# Patient Record
Sex: FEMALE | Race: WHITE
[De-identification: names, ages, dates, MRNs, and addresses within clinical notes are randomized per-mention and may not be internally consistent; named-entity substitution may affect disease eponyms.]

---

## 2017-07-30 ENCOUNTER — HOSPITAL ENCOUNTER (EMERGENCY)
Dept: HOSPITAL 10 - FTE | Age: 67
Discharge: HOME | End: 2017-07-30
Payer: COMMERCIAL

## 2017-07-30 VITALS
WEIGHT: 167.55 LBS | BODY MASS INDEX: 30.83 KG/M2 | HEIGHT: 62 IN | WEIGHT: 167.55 LBS | HEIGHT: 62 IN | BODY MASS INDEX: 30.83 KG/M2

## 2017-07-30 DIAGNOSIS — M25.561: Primary | ICD-10-CM

## 2017-07-30 DIAGNOSIS — I10: ICD-10-CM

## 2017-07-30 PROCEDURE — 73562 X-RAY EXAM OF KNEE 3: CPT
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
## 2017-07-30 NOTE — RADRPT
PROCEDURE:   XR Knee. 

 

CLINICAL INDICATION:   Right knee pain. 

 

TECHNIQUE:   AP, lateral and oblique views of the right knee were obtained. The images reviewed  on 
a PACS workstation. 

 

COMPARISON:   None. 

 

FINDINGS:

The bones appear intact, with no evidence of fracture, erosion, demineralization, or dislocation. Th
e alignment of the femorotibial and patellofemoral joints appears normal. No joint space narrowing i
s seen. No evidence of effusion.  No soft tissue swelling is present. 

 

IMPRESSION:

Unremarkable examination of the right knee. 

 

RPTAT: HPNM

_____________________________________________ 

Physician Agapito           Date    Time 

Electronically viewed and signed by Physician Agapito on 07/30/2017 13:09 

 

D:  07/30/2017 13:09  T:  07/30/2017 13:09

PM/

## 2018-08-25 ENCOUNTER — HOSPITAL ENCOUNTER (EMERGENCY)
Age: 68
Discharge: HOME | End: 2018-08-25

## 2018-08-25 ENCOUNTER — HOSPITAL ENCOUNTER (EMERGENCY)
Dept: HOSPITAL 91 - FTE | Age: 68
Discharge: HOME | End: 2018-08-25
Payer: COMMERCIAL

## 2018-08-25 DIAGNOSIS — J40: Primary | ICD-10-CM

## 2018-08-25 DIAGNOSIS — I10: ICD-10-CM

## 2018-08-25 DIAGNOSIS — E11.9: ICD-10-CM

## 2018-08-25 PROCEDURE — 99283 EMERGENCY DEPT VISIT LOW MDM: CPT

## 2018-08-25 PROCEDURE — 71045 X-RAY EXAM CHEST 1 VIEW: CPT

## 2018-09-08 ENCOUNTER — HOSPITAL ENCOUNTER (EMERGENCY)
Dept: HOSPITAL 91 - E/R | Age: 68
Discharge: HOME | End: 2018-09-08
Payer: COMMERCIAL

## 2018-09-08 DIAGNOSIS — I10: ICD-10-CM

## 2018-09-08 DIAGNOSIS — J44.1: ICD-10-CM

## 2018-09-08 DIAGNOSIS — J40: Primary | ICD-10-CM

## 2018-09-08 DIAGNOSIS — E11.9: ICD-10-CM

## 2018-09-08 DIAGNOSIS — Z79.84: ICD-10-CM

## 2018-09-08 LAB
ADD MAN DIFF?: NO
ANION GAP: 11 (ref 8–16)
B-TYPE NATRIURETIC PEPTIDE: 271 PG/ML (ref 0–125)
BASOPHIL #: 0.1 10^3/UL (ref 0–0.1)
BASOPHILS %: 0.9 % (ref 0–2)
BLOOD UREA NITROGEN: 12 MG/DL (ref 7–20)
CALCIUM: 9.1 MG/DL (ref 8.4–10.2)
CARBON DIOXIDE: 29 MMOL/L (ref 21–31)
CHLORIDE: 107 MMOL/L (ref 97–110)
CREATININE: 0.66 MG/DL (ref 0.44–1)
EOSINOPHILS #: 0.3 10^3/UL (ref 0–0.5)
EOSINOPHILS %: 5.1 % (ref 0–7)
GLUCOSE: 88 MG/DL (ref 70–220)
HEMATOCRIT: 38.1 % (ref 37–47)
HEMOGLOBIN: 12.5 G/DL (ref 12–16)
IMMATURE GRANS #M: 0.01 10^3/UL (ref 0–0.03)
IMMATURE GRANS % (M): 0.2 % (ref 0–0.43)
LACTIC ACID: 0.6 MMOL/L (ref 0.5–2)
LYMPHOCYTES #: 2.2 10^3/UL (ref 0.8–2.9)
LYMPHOCYTES %: 39.9 % (ref 15–51)
MEAN CORPUSCULAR HEMOGLOBIN: 29 PG (ref 29–33)
MEAN CORPUSCULAR HGB CONC: 32.8 G/DL (ref 32–37)
MEAN CORPUSCULAR VOLUME: 88.4 FL (ref 82–101)
MEAN PLATELET VOLUME: 9 FL (ref 7.4–10.4)
MONOCYTE #: 0.5 10^3/UL (ref 0.3–0.9)
MONOCYTES %: 9.7 % (ref 0–11)
NEUTROPHIL #: 2.4 10^3/UL (ref 1.6–7.5)
NEUTROPHILS %: 44.2 % (ref 39–77)
NUCLEATED RED BLOOD CELLS #: 0 10^3/UL (ref 0–0)
NUCLEATED RED BLOOD CELLS%: 0 /100WBC (ref 0–0)
PLATELET COUNT: 199 10^3/UL (ref 140–415)
POTASSIUM: 4.2 MMOL/L (ref 3.5–5.1)
RED BLOOD COUNT: 4.31 10^6/UL (ref 4.2–5.4)
RED CELL DISTRIBUTION WIDTH: 12.4 % (ref 11.5–14.5)
SODIUM: 143 MMOL/L (ref 135–144)
TROPONIN-I: < 0.012 NG/ML (ref 0–0.12)
WHITE BLOOD COUNT: 5.5 10^3/UL (ref 4.8–10.8)

## 2018-09-08 PROCEDURE — 93005 ELECTROCARDIOGRAM TRACING: CPT

## 2018-09-08 PROCEDURE — 71045 X-RAY EXAM CHEST 1 VIEW: CPT

## 2018-09-08 PROCEDURE — 84484 ASSAY OF TROPONIN QUANT: CPT

## 2018-09-08 PROCEDURE — 83605 ASSAY OF LACTIC ACID: CPT

## 2018-09-08 PROCEDURE — 99285 EMERGENCY DEPT VISIT HI MDM: CPT

## 2018-09-08 PROCEDURE — 80048 BASIC METABOLIC PNL TOTAL CA: CPT

## 2018-09-08 PROCEDURE — 94644 CONT INHLJ TX 1ST HOUR: CPT

## 2018-09-08 PROCEDURE — 85025 COMPLETE CBC W/AUTO DIFF WBC: CPT

## 2018-09-08 PROCEDURE — 83880 ASSAY OF NATRIURETIC PEPTIDE: CPT

## 2018-09-08 RX ADMIN — ALBUTEROL SULFATE 1 MG: 2.5 SOLUTION RESPIRATORY (INHALATION) at 13:42

## 2018-09-08 RX ADMIN — IPRATROPIUM BROMIDE 1 MG: 0.5 SOLUTION RESPIRATORY (INHALATION) at 13:42

## 2018-09-08 RX ADMIN — AZITHROMYCIN 1 MG: 250 TABLET, FILM COATED ORAL at 14:30

## 2018-09-08 RX ADMIN — THIAMINE HYDROCHLORIDE 1 MLS/HR: 100 INJECTION, SOLUTION INTRAMUSCULAR; INTRAVENOUS at 13:00

## 2018-11-24 ENCOUNTER — HOSPITAL ENCOUNTER (EMERGENCY)
Age: 68
LOS: 1 days | Discharge: HOME | End: 2018-11-25

## 2018-11-24 ENCOUNTER — HOSPITAL ENCOUNTER (EMERGENCY)
Dept: HOSPITAL 91 - FTE | Age: 68
LOS: 1 days | Discharge: HOME | End: 2018-11-25
Payer: COMMERCIAL

## 2018-11-24 DIAGNOSIS — E11.9: ICD-10-CM

## 2018-11-24 DIAGNOSIS — J40: ICD-10-CM

## 2018-11-24 DIAGNOSIS — I10: ICD-10-CM

## 2018-11-24 DIAGNOSIS — R07.0: Primary | ICD-10-CM

## 2018-11-24 DIAGNOSIS — N39.0: ICD-10-CM

## 2018-11-24 DIAGNOSIS — Z79.84: ICD-10-CM

## 2018-11-24 LAB
ADD MAN DIFF?: NO
ADD UMIC: YES
ALANINE AMINOTRANSFERASE: 15 IU/L (ref 13–69)
ALBUMIN/GLOBULIN RATIO: 1.68
ALBUMIN: 4.2 G/DL (ref 3.3–4.9)
ALKALINE PHOSPHATASE: 86 IU/L (ref 42–121)
ANION GAP: 9 (ref 5–13)
ASPARTATE AMINO TRANSFERASE: 18 IU/L (ref 15–46)
BASOPHIL #: 0 10^3/UL (ref 0–0.1)
BASOPHILS %: 0.6 % (ref 0–2)
BILIRUBIN,DIRECT: 0 MG/DL (ref 0–0.2)
BILIRUBIN,TOTAL: 0.4 MG/DL (ref 0.2–1.3)
BLOOD UREA NITROGEN: 16 MG/DL (ref 7–20)
CALCIUM: 9.6 MG/DL (ref 8.4–10.2)
CARBON DIOXIDE: 29 MMOL/L (ref 21–31)
CHLORIDE: 102 MMOL/L (ref 97–110)
CREATININE: 0.95 MG/DL (ref 0.44–1)
EOSINOPHILS #: 0.2 10^3/UL (ref 0–0.5)
EOSINOPHILS %: 2.9 % (ref 0–7)
GLOBULIN: 2.5 G/DL (ref 1.3–3.2)
GLUCOSE: 121 MG/DL (ref 70–220)
HEMATOCRIT: 40.5 % (ref 37–47)
HEMOGLOBIN: 13.2 G/DL (ref 12–16)
IMMATURE GRANS #M: 0.02 10^3/UL (ref 0–0.03)
IMMATURE GRANS % (M): 0.3 % (ref 0–0.43)
INR: 0.87
LACTIC ACID: 1.3 MMOL/L (ref 0.5–2)
LYMPHOCYTES #: 2.8 10^3/UL (ref 0.8–2.9)
LYMPHOCYTES %: 40.9 % (ref 15–51)
MEAN CORPUSCULAR HEMOGLOBIN: 29.1 PG (ref 29–33)
MEAN CORPUSCULAR HGB CONC: 32.6 G/DL (ref 32–37)
MEAN CORPUSCULAR VOLUME: 89.2 FL (ref 82–101)
MEAN PLATELET VOLUME: 9.4 FL (ref 7.4–10.4)
MONOCYTE #: 0.6 10^3/UL (ref 0.3–0.9)
MONOCYTES %: 8.3 % (ref 0–11)
NEUTROPHIL #: 3.2 10^3/UL (ref 1.6–7.5)
NEUTROPHILS %: 47 % (ref 39–77)
NUCLEATED RED BLOOD CELLS #: 0 10^3/UL (ref 0–0)
NUCLEATED RED BLOOD CELLS%: 0 /100WBC (ref 0–0)
PARTIAL THROMBOPLASTIN TIME: 22.2 SEC (ref 23–35)
PLATELET COUNT: 238 10^3/UL (ref 140–415)
POTASSIUM: 4.7 MMOL/L (ref 3.5–5.1)
PROTIME: 11.9 SEC (ref 11.9–14.9)
PT RATIO: 0.9
RED BLOOD COUNT: 4.54 10^6/UL (ref 4.2–5.4)
RED CELL DISTRIBUTION WIDTH: 12.3 % (ref 11.5–14.5)
SODIUM: 140 MMOL/L (ref 135–144)
TOTAL PROTEIN: 6.7 G/DL (ref 6.1–8.1)
TROPONIN-I: < 0.012 NG/ML (ref 0–0.12)
UR ASCORBIC ACID: NEGATIVE MG/DL
UR BACTERIA: (no result) /HPF
UR BILIRUBIN (DIP): NEGATIVE MG/DL
UR BLOOD (DIP): NEGATIVE MG/DL
UR CLARITY: CLEAR
UR COLOR: (no result)
UR GLUCOSE (DIP): NEGATIVE MG/DL
UR KETONES (DIP): NEGATIVE MG/DL
UR LEUKOCYTE ESTERASE (DIP): (no result) LEU/UL
UR NITRITE (DIP): NEGATIVE MG/DL
UR PH (DIP): 7 (ref 5–9)
UR RBC: 1 /HPF (ref 0–5)
UR SPECIFIC GRAVITY (DIP): 1.01 (ref 1–1.03)
UR SQUAMOUS EPITHELIAL CELL: (no result) /HPF
UR TOTAL PROTEIN (DIP): NEGATIVE MG/DL
UR UROBILINOGEN (DIP): NEGATIVE MG/DL
UR WBC: 2 /HPF (ref 0–5)
WHITE BLOOD COUNT: 6.9 10^3/UL (ref 4.8–10.8)

## 2018-11-24 PROCEDURE — 84484 ASSAY OF TROPONIN QUANT: CPT

## 2018-11-24 PROCEDURE — 85610 PROTHROMBIN TIME: CPT

## 2018-11-24 PROCEDURE — 87040 BLOOD CULTURE FOR BACTERIA: CPT

## 2018-11-24 PROCEDURE — 93005 ELECTROCARDIOGRAM TRACING: CPT

## 2018-11-24 PROCEDURE — 99285 EMERGENCY DEPT VISIT HI MDM: CPT

## 2018-11-24 PROCEDURE — 85730 THROMBOPLASTIN TIME PARTIAL: CPT

## 2018-11-24 PROCEDURE — 71046 X-RAY EXAM CHEST 2 VIEWS: CPT

## 2018-11-24 PROCEDURE — 85025 COMPLETE CBC W/AUTO DIFF WBC: CPT

## 2018-11-24 PROCEDURE — 81001 URINALYSIS AUTO W/SCOPE: CPT

## 2018-11-24 PROCEDURE — 80053 COMPREHEN METABOLIC PANEL: CPT

## 2018-11-24 PROCEDURE — 87086 URINE CULTURE/COLONY COUNT: CPT

## 2018-11-24 PROCEDURE — 87400 INFLUENZA A/B EACH AG IA: CPT

## 2018-11-24 PROCEDURE — 83605 ASSAY OF LACTIC ACID: CPT

## 2018-11-24 RX ADMIN — THIAMINE HYDROCHLORIDE 1 MLS/HR: 100 INJECTION, SOLUTION INTRAMUSCULAR; INTRAVENOUS at 21:23

## 2018-11-24 RX ADMIN — LIDOCAINE HYDROCHLORIDE 1 MLS/HR: 10 INJECTION, SOLUTION EPIDURAL; INFILTRATION; INTRACAUDAL; PERINEURAL at 22:59

## 2018-11-24 RX ADMIN — ACETAMINOPHEN 1 MG: 500 TABLET, FILM COATED ORAL at 22:20

## 2019-02-15 ENCOUNTER — HOSPITAL ENCOUNTER (EMERGENCY)
Dept: HOSPITAL 91 - FTE | Age: 69
Discharge: HOME | End: 2019-02-15
Payer: COMMERCIAL

## 2019-02-15 ENCOUNTER — HOSPITAL ENCOUNTER (EMERGENCY)
Dept: HOSPITAL 10 - FTE | Age: 69
Discharge: HOME | End: 2019-02-15
Payer: COMMERCIAL

## 2019-02-15 VITALS — DIASTOLIC BLOOD PRESSURE: 81 MMHG | SYSTOLIC BLOOD PRESSURE: 147 MMHG | HEART RATE: 71 BPM | RESPIRATION RATE: 18 BRPM

## 2019-02-15 VITALS — BODY MASS INDEX: 34.39 KG/M2 | WEIGHT: 148.59 LBS | HEIGHT: 55 IN

## 2019-02-15 DIAGNOSIS — Z79.84: ICD-10-CM

## 2019-02-15 DIAGNOSIS — I10: ICD-10-CM

## 2019-02-15 DIAGNOSIS — E11.9: ICD-10-CM

## 2019-02-15 DIAGNOSIS — J06.9: Primary | ICD-10-CM

## 2019-02-15 PROCEDURE — 99283 EMERGENCY DEPT VISIT LOW MDM: CPT

## 2019-02-15 PROCEDURE — 71046 X-RAY EXAM CHEST 2 VIEWS: CPT

## 2019-02-15 NOTE — ERD
ER Documentation


Chief Complaint


Chief Complaint





SORE THROAT





HPI


68-year-old female presenting to the ED complaining of sore throat, cough, 


congestion for the past week.  She denies fevers.  Patient denies chest pain, 


shortness of breath.  States that she is tried over-the-counter medication 


earlier without any relief





ROS


All systems reviewed and are negative except as per history of present illness.





Medications


Home Meds


Active Scripts


Fluticasone Propionate (Flonase Allergy Relief) 9.9 Ml Luther.susp, 1 SPRAY NASAL


DAILY, #1 BOTTLE


   TO EACH NOSTRIL


   Prov:GENET PAVON-C         2/15/19


Cetirizine Hcl* (Zyrtec*) 10 Mg Capsule, 10 MG PO DAILY, #30 TAB.CHEW


   Prov:GENET PAVONC         2/15/19


Benzonatate* (Tessalon Perle*) 100 Mg Capsule, 100 MG PO Q8H PRN for COUGH, #30 


CAP


   Prov:GENET PAVONC         2/15/19


Acetaminophen* (Acetaminophen*) 650 Mg Tablet, 650 MG PO Q6 PRN for PAIN AND OR 


ELEVATED TEMP, #30 TAB


   Prov:GENET PAVON PA-C         2/15/19


Guaifenesin* (Robitussin*) 100 Mg/5 Ml Syrup, 100 MG PO Q4H PRN for COUGH, #120 


ML


   Prov:ANTONELLA VILLARREAL         11/24/18


Ondansetron Hcl* (Zofran*) 4 Mg Tablet, 4 MG PO Q8H PRN for NAUSEA AND/OR 


VOMITING, #30 TAB


   Prov:PASANTONELLA STATON         11/24/18


Acetaminophen* (Tylophen*) 500 Mg Capsule, 1 CAP PO Q6H PRN for PAIN AND OR 


ELEVATED TEMP, #20 CAP


   Prov:PASILAANTONELLA ADAIR         11/24/18


Cephalexin* (Keflex*) 500 Mg Capsule, 500 MG PO TID for 7 Days, CAP


   Prov:PASILAANTONELLA ADAIR F         11/24/18


Azithromycin* (Azithromycin*) 250 Mg Tablet, 250 MG PO DAILY, #4 TAB


   Prov:SHERIN ATKINS MD         9/8/18


Benzonatate* (Tessalon Perle*) 100 Mg Capsule, 100 MG PO Q8H PRN for COUGH, #12 


CAP


   Prov:SHERIN ATKINS MD         9/8/18


Albuterol Sulfate* (Ventolin HFA*) 18 Gm Hfa.aer.ad, 2 PUFF INHALATION Q4H for 


wheezing, #1 INHALER


   Prov:SHERIN ATKINS MD         9/8/18


Prednisone* (Prednisone*) 20 Mg Tab, 40 MG PO DAILY for 4 Days, TAB


   Prov:SHERIN ATKINS MD         9/8/18


Reported Medications


Atorvastatin Calcium* (Atorvastatin Calcium*) 20 Mg Tablet, 20 MG PO QHS, #30 


TAB


   9/8/18


Metformin Hcl* (Metformin Hcl*) 1,000 Mg Tablet, 1000 MG PO WITH BREAKFAST 


DINNE, #60 TAB


   9/8/18


Loratadine* (Loratadine*) 10 Mg Tablet, 10 MG PO DAILY, #30 TAB


   9/8/18


Cholecalciferol* (Vitamin D3*) 1,000 Unit Tablet, 1000 UNIT PO DAILY, TAB


   9/8/18


Glimepiride* (Glimepiride*) 1 Mg Tablet, 1 MG PO WITH BREAKFAST, TAB


   9/8/18


Propranolol Hcl* (Propranolol Hcl*) 10 Mg Tablet, 10 MG PO BID, TAB


   9/8/18





Allergies


Allergies:  


Coded Allergies:  


     No Known Allergy (Unverified , 9/8/18)





PMhx/Soc


History of Surgery:  Yes (C SECTION )


Anesthesia Reaction:  No


Hx Neurological Disorder:  No


Hx Respiratory Disorders:  No


Hx Cardiac Disorders:  Yes (HTN, HLD)


Hx Psychiatric Problems:  No


Hx Miscellaneous Medical Probl:  Yes (DM)


Hx Alcohol Use:  No


Hx Substance Use:  No


Hx Tobacco Use:  No





Physical Exam


Vitals





Vital Signs


  Date      Temp  Pulse  Resp  B/P (MAP)   Pulse Ox  O2          O2 Flow    FiO2


Time                                                 Delivery    Rate


   2/15/19  98.1     71    18      147/81        99


     12:57                          (103)





Physical Exam


Const:   No acute distress


Head:   Atraumatic 


Eyes:    Normal Conjunctiva


ENT:    Normal External Ears, Nose and Mouth.


Neck:               Full range of motion. No meningismus.


Resp:   Clear to auscultation bilaterally


Cardio:   Regular rate and rhythm, no murmurs


Abd:    Soft, non tender, non distended. Normal bowel sounds


Skin:   No petechiae or rashes


Back:   No midline or flank tenderness


Ext:    No cyanosis, or edema


Neur:   Awake and alert


Psych:    Normal Mood and Affect





Procedures/MDM


38-year-old female presents to the ER with upper respiratory infection, which is


most likely viral. My clinical suspicion is low suspicion for pneumonia, strep 


pharyngitis, or pulmonary emergencies due to physical examination. Patient's 


lungs were clear on examination. 


 stable for discharge. Prescription for Tessalon Perles, Tylenol, Flonase and 


Zyrtec was given to patient, discussed to return to the ED if not improving as 


expected or follow-up with a primary care physician. Patient understood and 


agreed with this plan.





CXR 


 


Calcified aorta consistent with atherosclerotic disease.


Mild degenerative changes of the thoracic spine.


No acute disease.





Departure


Diagnosis:  


   Primary Impression:  


   URI (upper respiratory infection)


Condition:  Stable


Patient Instructions:  Uri, Viral, No Abx (Adult)





Additional Instructions:  


Visite a reyna christoph chakraborty para un EXAMEN.Regrese a estas instalaciones si no se 


mejora jaelyn esperbamos o jaelyn le dijimos.








[place] toda la medicina quang y jaelyn se le indic.








Regrese a estas instalaciones si no se mejora jaelyn esperbamos o jaelyn le 


dijimos.











GENET PAVON PA-C      Feb 15, 2019 15:47